# Patient Record
(demographics unavailable — no encounter records)

---

## 2025-06-11 NOTE — PHYSICAL EXAM
[Left] : left foot and ankle [Mild] : mild swelling of lateral foot [5___] : plantar flexion 5[unfilled]/5 [2+] : dorsalis pedis pulse: 2+ [] : ambulation with crutches [de-identified] : dec at baseline [TWNoteComboBox7] : dorsiflexion 10 degrees [de-identified] : plantar flexion 30 degrees

## 2025-06-11 NOTE — DATA REVIEWED
[Outside X-rays] : outside x-rays [Left] : left [Foot] : foot [I reviewed the films/CD and additionally noted] : I reviewed the films/CD and additionally noted [FreeTextEntry1] : prox 5th mt fx

## 2025-06-11 NOTE — ASSESSMENT
[FreeTextEntry1] : protected wb in cam boot ice/elevate nsaids prn limit activity f/up 3 wks w/ foot xray  We discussed the additional risk and side effects associated with diabetes - including but not limited to the negative impact on healing, infection rate, potential failure of surgical implants, and negative patient outcomes.  The patient was counseled on glycemic control  Home

## 2025-07-02 NOTE — HISTORY OF PRESENT ILLNESS
[de-identified] : 06/11/2025:  fall 2 days ago w/ foot pain. went to  and placed in splint. no prior foot injury. h/o neuropathy. no prior foot surgery. +dm (a1c >9). denies tob.  [facility]  07/02/2025: improving. wb in boot w/ crutch.  [] : no [FreeTextEntry1] : left foot [de-identified] : crutches, splint [de-identified] : [facility] [de-identified] : XR

## 2025-07-02 NOTE — PHYSICAL EXAM
[Left] : left foot and ankle [Mild] : mild swelling of lateral foot [2+] : dorsalis pedis pulse: 2+ [] : no achilles tendon tenderness [NL (40)] : plantar flexion 40 degrees [NL 30)] : inversion 30 degrees [NL (20)] : eversion 20 degrees [5___] : eversion 5[unfilled]/5 [FreeTextEntry8] : improved [de-identified] : dec at baseline [de-identified] : 1 crtuch [TWNoteComboBox7] : dorsiflexion 15 degrees

## 2025-07-23 NOTE — HISTORY OF PRESENT ILLNESS
[de-identified] : 06/11/2025:  fall 2 days ago w/ foot pain. went to  and placed in splint. no prior foot injury. h/o neuropathy. no prior foot surgery. +dm (a1c >9). denies tob.  [facility]  07/02/2025: improving. wb in boot w/ crutch.   07/23/2025:  improving.  has been active. walking in reg shoes [] : no [FreeTextEntry1] : left foot [de-identified] : crutches, splint [de-identified] : [facility] [de-identified] : XR

## 2025-07-23 NOTE — PHYSICAL EXAM
[Left] : left foot and ankle [NL (40)] : plantar flexion 40 degrees [NL 30)] : inversion 30 degrees [5___] : eversion 5[unfilled]/5 [2+] : dorsalis pedis pulse: 2+ [NL (20)] : dorsiflexion 20 degrees [] : patient ambulates without assistive device [de-identified] : dec at baseline